# Patient Record
Sex: FEMALE | Race: BLACK OR AFRICAN AMERICAN | NOT HISPANIC OR LATINO | URBAN - METROPOLITAN AREA
[De-identification: names, ages, dates, MRNs, and addresses within clinical notes are randomized per-mention and may not be internally consistent; named-entity substitution may affect disease eponyms.]

---

## 2022-07-25 ENCOUNTER — NEW PATIENT (OUTPATIENT)
Dept: URBAN - METROPOLITAN AREA CLINIC 9 | Facility: CLINIC | Age: 57
End: 2022-07-25

## 2022-07-25 DIAGNOSIS — H04.123: ICD-10-CM

## 2022-07-25 DIAGNOSIS — H10.45: ICD-10-CM

## 2022-07-25 DIAGNOSIS — H52.223: ICD-10-CM

## 2022-07-25 PROCEDURE — 92015 DETERMINE REFRACTIVE STATE: CPT

## 2022-07-25 PROCEDURE — 92004 COMPRE OPH EXAM NEW PT 1/>: CPT

## 2022-07-25 ASSESSMENT — TONOMETRY
OS_IOP_MMHG: 10
OD_IOP_MMHG: 12

## 2022-07-25 ASSESSMENT — KERATOMETRY
OD_AXISANGLE_DEGREES: 003
OD_AXISANGLE2_DEGREES: 93
OS_AXISANGLE_DEGREES: 173
OS_K1POWER_DIOPTERS: 46.50
OD_K1POWER_DIOPTERS: 46.00
OS_AXISANGLE2_DEGREES: 83
OS_K2POWER_DIOPTERS: 48.50
OD_K2POWER_DIOPTERS: 47.50

## 2022-07-25 ASSESSMENT — VISUAL ACUITY
OS_SC: J3
OS_SC: 20/50
OD_SC: 20/50
OD_SC: J2
OU_SC: 20/25-2
OU_SC: J2

## 2022-08-28 ASSESSMENT — KERATOMETRY
OS_K2POWER_DIOPTERS: 48.50
OS_AXISANGLE2_DEGREES: 83
OD_AXISANGLE_DEGREES: 003
OD_AXISANGLE2_DEGREES: 93
OD_K1POWER_DIOPTERS: 46.00
OD_K2POWER_DIOPTERS: 47.50
OS_K1POWER_DIOPTERS: 46.50
OS_AXISANGLE_DEGREES: 173

## 2022-08-29 ENCOUNTER — PREPPED CHART (OUTPATIENT)
Dept: URBAN - METROPOLITAN AREA CLINIC 9 | Facility: CLINIC | Age: 57
End: 2022-08-29

## 2022-09-19 ENCOUNTER — ESTABLISHED PATIENT (OUTPATIENT)
Dept: URBAN - METROPOLITAN AREA CLINIC 9 | Facility: CLINIC | Age: 57
End: 2022-09-19

## 2022-09-19 DIAGNOSIS — H04.123: ICD-10-CM

## 2022-09-19 DIAGNOSIS — H01.02B: ICD-10-CM

## 2022-09-19 DIAGNOSIS — H01.02A: ICD-10-CM

## 2022-09-19 PROCEDURE — 92012 INTRM OPH EXAM EST PATIENT: CPT

## 2022-09-19 RX ORDER — LOTEPREDNOL ETABONATE 5 MG/ML
1 SUSPENSION/ DROPS OPHTHALMIC TWICE A DAY
Start: 2022-09-19

## 2022-09-19 RX ORDER — LIFITEGRAST 50 MG/ML
1 SOLUTION/ DROPS OPHTHALMIC TWICE A DAY
Start: 2022-09-19

## 2022-09-19 RX ORDER — ERYTHROMYCIN 5 MG/G
OINTMENT OPHTHALMIC EVERY EVENING
Start: 2022-09-19

## 2022-09-19 ASSESSMENT — VISUAL ACUITY
OS_PH: 20/30+1
OD_SC: 20/40
OD_PH: 20/20-1
OS_SC: 20/30-2

## 2022-09-19 ASSESSMENT — TONOMETRY
OS_IOP_MMHG: 9
OD_IOP_MMHG: 10

## 2022-10-10 NOTE — PATIENT DISCUSSION
May be atypical as clinical appearance is normal and no sign of pathology in Right eye. 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 Consult Dr Berta Cordero.

## 2022-10-24 ENCOUNTER — FOLLOW UP (OUTPATIENT)
Dept: URBAN - METROPOLITAN AREA CLINIC 9 | Facility: CLINIC | Age: 57
End: 2022-10-24

## 2022-10-24 DIAGNOSIS — H04.123: ICD-10-CM

## 2022-10-24 DIAGNOSIS — H01.02A: ICD-10-CM

## 2022-10-24 DIAGNOSIS — H01.02B: ICD-10-CM

## 2022-10-24 PROCEDURE — 99213 OFFICE O/P EST LOW 20 MIN: CPT

## 2022-10-24 ASSESSMENT — VISUAL ACUITY
OU_SC: 20/25
OD_SC: 20/40-2
OS_SC: 20/40-2

## 2022-10-24 ASSESSMENT — TONOMETRY
OD_IOP_MMHG: 11
OS_IOP_MMHG: 10

## 2022-11-07 NOTE — PATIENT DISCUSSION
No sign of intraretinal or subretinal fluid on OCT today.  Discussed treatment options if she develops edema.  Recommend re-evaluating in 4 mths.

## 2022-11-07 NOTE — PATIENT DISCUSSION
May be atypical as clinical appearance is normal and no sign of pathology in Right eye. 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 Consult Dr Gilma Best.

## 2022-11-21 ENCOUNTER — FOLLOW UP (OUTPATIENT)
Dept: URBAN - METROPOLITAN AREA CLINIC 9 | Facility: CLINIC | Age: 57
End: 2022-11-21

## 2022-11-21 DIAGNOSIS — H01.02B: ICD-10-CM

## 2022-11-21 DIAGNOSIS — H04.123: ICD-10-CM

## 2022-11-21 DIAGNOSIS — H10.45: ICD-10-CM

## 2022-11-21 DIAGNOSIS — H01.02A: ICD-10-CM

## 2022-11-21 PROCEDURE — 99213 OFFICE O/P EST LOW 20 MIN: CPT

## 2022-11-21 RX ORDER — AZELASTINE HYDROCHLORIDE 0.5 MG/ML
1 SOLUTION/ DROPS INTRAOCULAR TWICE A DAY
Start: 2022-11-21

## 2022-11-21 ASSESSMENT — TONOMETRY
OD_IOP_MMHG: 11
OS_IOP_MMHG: 9

## 2022-11-21 ASSESSMENT — VISUAL ACUITY
OS_SC: 20/30-2
OD_SC: 20/40-1

## 2022-12-05 ENCOUNTER — FOLLOW UP (OUTPATIENT)
Dept: URBAN - METROPOLITAN AREA CLINIC 9 | Facility: CLINIC | Age: 57
End: 2022-12-05

## 2022-12-05 PROCEDURE — 99213 OFFICE O/P EST LOW 20 MIN: CPT

## 2022-12-05 ASSESSMENT — VISUAL ACUITY
OS_PH: 20/30-2
OD_SC: 20/30-1
OS_SC: 20/40-2
OD_PH: 20/20

## 2022-12-05 ASSESSMENT — TONOMETRY
OD_IOP_MMHG: 12
OS_IOP_MMHG: 10

## 2023-01-30 ENCOUNTER — FOLLOW UP (OUTPATIENT)
Dept: URBAN - METROPOLITAN AREA CLINIC 9 | Facility: CLINIC | Age: 58
End: 2023-01-30

## 2023-01-30 DIAGNOSIS — H01.02A: ICD-10-CM

## 2023-01-30 DIAGNOSIS — H01.02B: ICD-10-CM

## 2023-01-30 DIAGNOSIS — H10.45: ICD-10-CM

## 2023-01-30 DIAGNOSIS — H04.123: ICD-10-CM

## 2023-01-30 PROCEDURE — 99213 OFFICE O/P EST LOW 20 MIN: CPT

## 2023-01-30 ASSESSMENT — TONOMETRY
OS_IOP_MMHG: 10
OD_IOP_MMHG: 10

## 2023-01-30 ASSESSMENT — VISUAL ACUITY
OD_SC: 20/40+1
OS_PH: 20/25-2
OU_SC: 20/30-2
OD_PH: 20/20-2
OS_SC: 20/30-2

## 2023-04-24 ENCOUNTER — FOLLOW UP (OUTPATIENT)
Dept: URBAN - METROPOLITAN AREA CLINIC 9 | Facility: CLINIC | Age: 58
End: 2023-04-24

## 2023-04-24 DIAGNOSIS — H10.45: ICD-10-CM

## 2023-04-24 DIAGNOSIS — H01.02B: ICD-10-CM

## 2023-04-24 DIAGNOSIS — H01.02A: ICD-10-CM

## 2023-04-24 DIAGNOSIS — H04.123: ICD-10-CM

## 2023-04-24 PROCEDURE — 99213 OFFICE O/P EST LOW 20 MIN: CPT

## 2023-04-24 RX ORDER — AZITHROMYCIN MONOHYDRATE 10 MG/ML
1 SOLUTION/ DROPS OPHTHALMIC EVERY EVENING
Start: 2023-04-24

## 2023-04-24 ASSESSMENT — VISUAL ACUITY
OS_SC: 20/30-2
OD_SC: 20/40

## 2023-04-24 ASSESSMENT — TONOMETRY
OD_IOP_MMHG: 14
OS_IOP_MMHG: 13

## 2023-07-17 ENCOUNTER — ESTABLISHED PATIENT (OUTPATIENT)
Dept: URBAN - METROPOLITAN AREA CLINIC 9 | Facility: CLINIC | Age: 58
End: 2023-07-17

## 2023-07-17 DIAGNOSIS — H10.45: ICD-10-CM

## 2023-07-17 DIAGNOSIS — H01.02B: ICD-10-CM

## 2023-07-17 DIAGNOSIS — H01.02A: ICD-10-CM

## 2023-07-17 DIAGNOSIS — H52.223: ICD-10-CM

## 2023-07-17 DIAGNOSIS — H04.123: ICD-10-CM

## 2023-07-17 PROCEDURE — 92014 COMPRE OPH EXAM EST PT 1/>: CPT

## 2023-07-17 PROCEDURE — 92015 DETERMINE REFRACTIVE STATE: CPT

## 2023-07-17 ASSESSMENT — KERATOMETRY
OS_K1POWER_DIOPTERS: 46.25
OD_K1POWER_DIOPTERS: 46.25
OD_K2POWER_DIOPTERS: 47.75
OS_AXISANGLE_DEGREES: 176
OS_AXISANGLE2_DEGREES: 86
OS_K2POWER_DIOPTERS: 48.25
OD_AXISANGLE2_DEGREES: 91
OD_AXISANGLE_DEGREES: 1

## 2023-07-17 ASSESSMENT — VISUAL ACUITY
OS_SC: 20/25
OU_SC: 20/25-2
OD_SC: 20/30+1

## 2023-07-17 ASSESSMENT — TONOMETRY
OS_IOP_MMHG: 14
OD_IOP_MMHG: 16

## 2023-11-06 ENCOUNTER — FOLLOW UP (OUTPATIENT)
Facility: LOCATION | Age: 58
End: 2023-11-06

## 2023-11-06 DIAGNOSIS — H10.45: ICD-10-CM

## 2023-11-06 DIAGNOSIS — H01.02A: ICD-10-CM

## 2023-11-06 DIAGNOSIS — H01.02B: ICD-10-CM

## 2023-11-06 DIAGNOSIS — H04.123: ICD-10-CM

## 2023-11-06 PROCEDURE — 99213 OFFICE O/P EST LOW 20 MIN: CPT

## 2023-11-06 ASSESSMENT — TONOMETRY
OD_IOP_MMHG: 14
OS_IOP_MMHG: 14

## 2023-11-06 ASSESSMENT — VISUAL ACUITY
OD_SC: 20/30-1
OS_SC: 20/30-1
OU_SC: 20/25-2

## 2024-07-15 ENCOUNTER — PREPPED CHART (OUTPATIENT)
Facility: LOCATION | Age: 59
End: 2024-07-15

## 2024-10-28 ENCOUNTER — COMPREHENSIVE EXAM (OUTPATIENT)
Facility: LOCATION | Age: 59
End: 2024-10-28

## 2024-10-28 DIAGNOSIS — H10.45: ICD-10-CM

## 2024-10-28 DIAGNOSIS — H25.13: ICD-10-CM

## 2024-10-28 DIAGNOSIS — H01.02A: ICD-10-CM

## 2024-10-28 DIAGNOSIS — H52.223: ICD-10-CM

## 2024-10-28 DIAGNOSIS — H35.033: ICD-10-CM

## 2024-10-28 DIAGNOSIS — H01.02B: ICD-10-CM

## 2024-10-28 DIAGNOSIS — H04.123: ICD-10-CM

## 2024-10-28 PROCEDURE — 92015 DETERMINE REFRACTIVE STATE: CPT

## 2024-10-28 PROCEDURE — 92014 COMPRE OPH EXAM EST PT 1/>: CPT

## 2025-04-28 ENCOUNTER — FOLLOW UP (OUTPATIENT)
Age: 60
End: 2025-04-28

## 2025-04-28 DIAGNOSIS — H04.123: ICD-10-CM

## 2025-04-28 DIAGNOSIS — H01.02B: ICD-10-CM

## 2025-04-28 DIAGNOSIS — H10.45: ICD-10-CM

## 2025-04-28 DIAGNOSIS — H01.02A: ICD-10-CM

## 2025-04-28 PROCEDURE — 99213 OFFICE O/P EST LOW 20 MIN: CPT
